# Patient Record
Sex: MALE
[De-identification: names, ages, dates, MRNs, and addresses within clinical notes are randomized per-mention and may not be internally consistent; named-entity substitution may affect disease eponyms.]

---

## 2020-08-21 ENCOUNTER — APPOINTMENT (OUTPATIENT)
Dept: ORTHOPEDIC SURGERY | Facility: CLINIC | Age: 35
End: 2020-08-21
Payer: COMMERCIAL

## 2020-08-21 VITALS
DIASTOLIC BLOOD PRESSURE: 64 MMHG | TEMPERATURE: 98.2 F | OXYGEN SATURATION: 98 % | HEART RATE: 72 BPM | SYSTOLIC BLOOD PRESSURE: 120 MMHG | HEIGHT: 68 IN | WEIGHT: 160 LBS | BODY MASS INDEX: 24.25 KG/M2

## 2020-08-21 DIAGNOSIS — L03.114 CELLULITIS OF LEFT UPPER LIMB: ICD-10-CM

## 2020-08-21 DIAGNOSIS — Z80.0 FAMILY HISTORY OF MALIGNANT NEOPLASM OF DIGESTIVE ORGANS: ICD-10-CM

## 2020-08-21 DIAGNOSIS — Z78.9 OTHER SPECIFIED HEALTH STATUS: ICD-10-CM

## 2020-08-21 PROBLEM — Z00.00 ENCOUNTER FOR PREVENTIVE HEALTH EXAMINATION: Status: ACTIVE | Noted: 2020-08-21

## 2020-08-21 PROCEDURE — 99204 OFFICE O/P NEW MOD 45 MIN: CPT

## 2020-08-21 RX ORDER — CEPHALEXIN 500 MG/1
500 TABLET ORAL
Qty: 56 | Refills: 0 | Status: ACTIVE | COMMUNITY
Start: 2020-08-21 | End: 1900-01-01

## 2020-08-21 NOTE — HISTORY OF PRESENT ILLNESS
[de-identified] : The patient is a 35 year old, RHD man presenting with left swelling.\par \par The patient presents with a two day history of atraumatic, left posterior elbow swelling and erythema.  The patient denies precipitating injury or trauma.  He denies restriction of range of motion.  The patient denies constitutional symptoms including fever, chills, malaise, weight loss, night sweats.  He thought he had a bug bite about two nights ago, but denies focal abrasion, laceration or discharge.  He used to wrestle at a younger age, but denies history of staph or MRSA infections.  \par \par Pain is rated 4/10, described as dull, improved with Advil, worse with elbow flexion. [4] : a current pain level of 4/10

## 2020-08-21 NOTE — PHYSICAL EXAM
[de-identified] : General: Well-nourished, well-developed, alert, and in no acute distress.\par Head: Normocephalic.\par Eyes: Pupils equal round reactive to light and accommodation, extraocular muscles intact, normal sclera.\par Nose: No nasal discharge.\par Cardiac: Regular rate. Extremities are warm and well perfused. Distal pulses are symmetric bilaterally.\par Respiratory: No labored breathing.\par Extremities: Sensation is intact distally bilaterally.  Distal pulses are symmetric bilaterally\par Lymphatic: No regional lymphadenopathy, no lymphedema\par Neurologic: No focal deficits\par Skin: Normal skin color, texture, and turgor\par Psychiatric: Normal affect\par MSK: as noted above/below\par \par \par LEFT ELBOW:\par \par Inspection: no rash, deformity, CIRCULAR WELL-CIRCUMSCRIBED ERYTHEMATOUS PATCHE AROUND POSTEROLATERAL ELBOW\par Joint Effusion: no\par ROM: elbow extension 0 , elbow flexion ~135 WITH PAIN DIRECTED TO POSTERIOR ELBOW , supination ~70, pronation ~70\par Palpation: NONINDURATED ERYTHEMATOUD PATCH, OLECRANON BURSA NONBALLATOBLE, NO pain at lateral epicondyle,  medial epicondyle,  distal triceps,  distal biceps,  proximal biceps, olecranon\par Distal Pulses: normal\par Upper Extremity Sensation: normal\par Upper Extremity Reflexes: normal\par \par Special Tests:\par Resisted wrist extension: negative\par Resisted middle finger extension: negative\par Resisted wrist flexion: negative\par Resisted supination: negative\par Resisted pronation: negative\par \par RIGHT ELBOW:\par \par Inspection: no rash, erythema, deformity\par Joint Effusion: no\par ROM: elbow extension 0 , elbow flexion ~135 , supination ~70, pronation ~70\par Palpation: NO pain at lateral epicondyle,  medial epicondyle,  distal triceps,  distal biceps,  proximal biceps, olecranon\par Distal Pulses: normal\par Upper Extremity Sensation: normal\par Upper Extremity Reflexes: normal\par \par Special Tests:\par Resisted wrist extension: negative\par Resisted middle finger extension: negative\par Resisted wrist flexion: negative\par Resisted supination: negative\par Resisted pronation: negative\par  [de-identified] : Patient defers Xray today\par \par Limited Ultrasound Left Elbow - There is soft tissue swelling at posterior elbow.  There does not appear to be expansion of the olecranon bursa\par

## 2020-08-21 NOTE — DISCUSSION/SUMMARY
[Medication Risks Reviewed] : Medication risks reviewed [de-identified] : The patient is a 35 year old, RHD man presenting with a two day history of left elbow redness and swelling.  He has preserved ROM and strength, and the olecranon bursa does not appear expanded under ultrasound.  His symptoms are likely related to elbow cellulitis.\par \par Imaging/Diagnostics were interpreted and results were reviewed with the patient in detail.  All questions were answered appropriately.\par \par The patient was counseled on the natural progression of the problem today.  The patient was provided reassurance today.\par \par Patient was prescribed a short course of Keflex for 5-7 days .Appropriate use of medication was reviewed with the patient in detail. Risks, benefits, and adverse effects medication were discussed.  \par \par If cellulitis does not improve, we discussed extended antibiotic treatment, broadening antibiotic coverage, obtaining serologies, obtaining imaging, referring to ID.\par \par Patient counseled on skin care.\par \par Follow-up in 1-2 weeks if no improvement.\par \par ------------------------------------------------------------------------------------------------------------------\par Patient appreciates and agrees with current plan.\par \par This note was generated using a mixture of manual typing and dragon medical dictation software.  A reasonable effort has been made for proofreading its contents, but typos may still remain.  If there are any questions or points of clarification needed please notify my office.\par \par >45 minutes of time was spent on total encounter.  >50% of the visit was spent on counseling/coordination of care and medical-decision making for this patient.\par \par \par